# Patient Record
Sex: MALE | Race: OTHER | HISPANIC OR LATINO | URBAN - METROPOLITAN AREA
[De-identification: names, ages, dates, MRNs, and addresses within clinical notes are randomized per-mention and may not be internally consistent; named-entity substitution may affect disease eponyms.]

---
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
## 2019-01-08 ENCOUNTER — EMERGENCY (EMERGENCY)
Facility: HOSPITAL | Age: 19
LOS: 1 days | Discharge: HOME | End: 2019-01-08
Admitting: EMERGENCY MEDICINE
Payer: MEDICAID

## 2019-01-08 VITALS
TEMPERATURE: 98 F | DIASTOLIC BLOOD PRESSURE: 72 MMHG | OXYGEN SATURATION: 100 % | RESPIRATION RATE: 16 BRPM | HEART RATE: 82 BPM | SYSTOLIC BLOOD PRESSURE: 140 MMHG

## 2019-01-08 PROCEDURE — 99282 EMERGENCY DEPT VISIT SF MDM: CPT

## 2019-01-08 NOTE — ED PROVIDER NOTE - MEDICAL DECISION MAKING DETAILS
17 y/o male presents to  c/o "I wanted to voluntarily admit myself to get my medications adjusted".  Physical examination completed and unremarkable for any acute issues/problems requiring immediate interventions.  PT cleared for discharge with [facility] information with discharge paperwork.  PT stated he was seen at the crisis center before and stated he will follow up there tomorrow.

## 2019-01-08 NOTE — ED ADULT NURSE NOTE - OBJECTIVE STATEMENT
Received pt in  pt calm & cooperative c/o depression  pt currently denies Si/H/AVh emotional reassurance provided safety & comfort measures maintained eval on going.

## 2019-01-08 NOTE — ED ADULT NURSE REASSESSMENT NOTE - NS ED NURSE REASSESS COMMENT FT1
pt calm & cooperative d/c by NP pt currently denies Si/H/AVh resources provided to pt upon discharge pt verbalizing understanding.

## 2019-01-08 NOTE — ED ADULT NURSE NOTE - NSIMPLEMENTINTERV_GEN_ALL_ED
Implemented All Universal Safety Interventions:  [place] to call system. Call bell, personal items and telephone within reach. Instruct patient to call for assistance. Room bathroom lighting operational. Non-slip footwear when patient is off stretcher. Physically safe environment: no spills, clutter or unnecessary equipment. Stretcher in lowest position, wheels locked, appropriate side rails in place.

## 2019-01-08 NOTE — ED PROVIDER NOTE - OBJECTIVE STATEMENT
19 y/o male presents to  c/o "I wanted to voluntarily admit myself to get my medications adjusted".  Pt informed that we do not admit patients for that.  Pt denies SI/HI/AH/VH and states: "I have nothing like that".  "I was feeling a bit depressed and thought I might just need my medications changed".  "I know I would also need detox, but they don't do that here".  I told patient yes, we do not offer detox here and asked why he didn't follow up with his psychiatrist.  Pt responded: "That's a long story, but if I can't get admitted, I can just see him then or find someone else".  Pt informed that he could be followed at the [facility] for his complaint,  Again asked if he was feeling like he would harm himself or anyone else, pt again responded: "No, I'm not suicidal or anything like that, I'm not trying to harm myself, I don't hear voices or see things".

## 2019-01-08 NOTE — ED PROVIDER NOTE - NSFOLLOWUPINSTRUCTIONS_ED_ALL_ED_FT
Pt will follow up with his psychiatrist for medication adjustment.  Feel free to f/u with the [facility] as instructed.